# Patient Record
Sex: MALE | Race: WHITE
[De-identification: names, ages, dates, MRNs, and addresses within clinical notes are randomized per-mention and may not be internally consistent; named-entity substitution may affect disease eponyms.]

---

## 2022-01-06 ENCOUNTER — HOSPITAL ENCOUNTER (EMERGENCY)
Dept: HOSPITAL 61 - ER | Age: 22
Discharge: HOME | End: 2022-01-06
Payer: SELF-PAY

## 2022-01-06 VITALS — WEIGHT: 148.15 LBS | HEIGHT: 66 IN | BODY MASS INDEX: 23.81 KG/M2

## 2022-01-06 VITALS — SYSTOLIC BLOOD PRESSURE: 135 MMHG | DIASTOLIC BLOOD PRESSURE: 81 MMHG

## 2022-01-06 DIAGNOSIS — U07.1: Primary | ICD-10-CM

## 2022-01-06 PROCEDURE — 99285 EMERGENCY DEPT VISIT HI MDM: CPT

## 2022-01-06 PROCEDURE — 96361 HYDRATE IV INFUSION ADD-ON: CPT

## 2022-01-06 PROCEDURE — 96375 TX/PRO/DX INJ NEW DRUG ADDON: CPT

## 2022-01-06 PROCEDURE — 71045 X-RAY EXAM CHEST 1 VIEW: CPT

## 2022-01-06 PROCEDURE — 87426 SARSCOV CORONAVIRUS AG IA: CPT

## 2022-01-06 PROCEDURE — 96374 THER/PROPH/DIAG INJ IV PUSH: CPT

## 2022-01-06 NOTE — PHYS DOC
General Adult


EDM:


Chief Complaint:  HEADACHE





HPI:


HPI:





Patient is a 22  year old male who present to ER for evaluation of headache, 

fever and chills.  Patient said he has been sick with body ache flulike symptom 

nonproductive cough diarrhea with nausea for over a week.  Patient has been 

drinking water but not able to eat much.  Patient was not vaccinated for COVID-

19.  Patient said he feels dehydrated.  Patient denies any abdominal pain.





Review of Systems:


Review of Systems:


Constitutional:   Positive for fever and chills


Eyes:   Denies change in visual acuity. []


HENT:   Denies nasal congestion or sore throat. [] 


Respiratory:   Positive for nonproductive cough, no trouble breathing.


Cardiovascular:   Denies chest pain or edema. [] 


GI:   Denies abdominal pain, positive for nausea vomiting diarrhea.


:  Denies dysuria. [] 


Musculoskeletal:   Positive for back pain and muscle pain, joint pain


Integument:   Denies rash. [] 


Neurologic:   Denies headache, focal weakness or sensory changes. [] 


Endocrine:   Denies polyuria or polydipsia. [] 


Lymphatic:  Denies swollen glands. [] 


Psychiatric:  Denies depression or anxiety. []





Heart Score:


C/O Chest Pain:  N/A


Risk Factors:


Risk Factors:  DM, Current or recent (<one month) smoker, HTN, HLP, family 

history of CAD, obesity.


Risk Scores:


Score 0 - 3:  2.5% MACE over next 6 weeks - Discharge Home


Score 4 - 6:  20.3% MACE over next 6 weeks - Admit for Clinical Observation


Score 7 - 10:  72.7% MACE over next 6 weeks - Early Invasive Strategies





Current Medications:





Current Medications








 Medications


  (Trade)  Dose


 Ordered  Sig/Luisa  Start Time


 Stop Time Status Last Admin


Dose Admin


 


 Ketorolac


 Tromethamine


  (Toradol 30mg


 Vial)  30 mg  1X  ONCE  22 09:30


 22 10:05 DC 22 10:02


30 MG


 


 Ondansetron HCl


  (Zofran)  4 mg  1X  ONCE  22 09:30


 22 10:05 DC 22 10:02


4 MG


 


 Sodium Chloride  1,000 ml @ 


 1,000 mls/hr  1X  ONCE  22 09:30


 22 10:29 DC 22 10:00


1,000 MLS/HR











Allergies:


Allergies:





Allergies








Coded Allergies Type Severity Reaction Last Updated Verified


 


  No Known Drug Allergies    22 No











Physical Exam:


PE:





Constitutional: Well developed, well nourished, no acute distress, non-toxic 

appearance. []


HENT: Normocephalic, atraumatic, bilateral external ears normal, oropharynx 

moist, no oral exudates, nose normal. []


Eyes: PERRLA, EOMI, conjunctiva normal, no discharge. [] 


Neck: Normal range of motion, no tenderness, supple, no stridor. [] 


Cardiovascular:Heart rate regular rhythm, no murmur []


Lungs & Thorax:  Bilateral breath sounds clear to auscultation []


Abdomen: Bowel sounds normal, soft, no tenderness, no masses, no pulsatile 

masses. [] 


Skin: Warm, dry, no erythema, no rash. [] 


Back: No tenderness, no CVA tenderness. [] 


Extremities: No tenderness, no cyanosis, no clubbing, ROM intact, no edema. [] 


Neurologic: Alert and oriented X 3, normal motor function, normal sensory 

function, no focal deficits noted. []


Psychologic: Affect normal, judgement normal, mood normal. []





Current Patient Data:


Labs:





                                Laboratory Tests








Test


 22


10:08


 


SARS-CoV-2 Antigen (Rapid)


 Positive


(NEGATIVE)  *A








Vital Signs:





                                   Vital Signs








  Date Time  Temp Pulse Resp B/P (MAP) Pulse Ox O2 Delivery O2 Flow Rate FiO2


 


22 10:03 99.4 58 16  100   





 99.4       











EKG:


EKG:


[]





Radiology/Procedures:


Radiology/Procedures:


[][facility]


                    [address] [place] [address]


                                 [phone]


                                        


                                 IMAGING REPORT





                                     Signed





PATIENT: FAWN DURANT   ACCOUNT: [id number]     MRN#: [id number]


: 2000           LOCATION: ER              AGE: 22


SEX: M                    EXAM DT: 22         ACCESSION#: [id number]


STATUS: REG ER            ORD. PHYSICIAN: DEEJAY DIETZ DO


REASON: cough, COVID INFECTION


PROCEDURE: CHEST AP ONLY








INDICATION: Reason: cough, COVID INFECTION / Spl. Instructions:  / History: 





COMPARISON: None.





FINDINGS:





Single view of chest obtained.


No focal airspace consolidation.  


Cardiomediastinal contour unremarkable.





No acute osseous abnormality.








IMPRESSION:





*  No focal airspace consolidation or edema.





Electronically signed by: Freddy Perez MD (2022 11:08 AM) [id number]














DICTATED and SIGNED BY:     FREDDY PEREZ MD


DATE:     22 [id number] 0





Course & Med Decision Making:


Course & Med Decision Making


Pertinent Labs and Imaging studies reviewed. (See chart for details)





Patient is a 22-year-old male who present to ER due to body ache, fever and 

chills, headache, cough.  Patient also have diarrhea.  Symptoms have been going 

on for about 1 week.  Patient was tested positive for COVID-19.  His chest x-ray

 was normal.  Patient oxygen saturation with normal.  Patient was given IV fluid

 in ER, he feel much better.  Patient  will be discharged home.





Dragon Disclaimer:


Dragon Disclaimer:


This electronic medical record was generated, in whole or in part, using a voice

 recognition dictation system.





Departure


Departure


Impression:  


   Primary Impression:  


   COVID-19 virus infection


Disposition:   HOME / SELF CARE / HOMELESS


Condition:  IMPROVED


Referrals:  


NO PCP (PCP)


Follow up with your doctor as needed.


Patient Instructions:  Viral Syndrome





Additional Instructions:  


You have been tested for or diagnosed with COVID-19. It is an infection caused 

by a new type 


of coronavirus. COVID-19 will cause cold-like or mild flu symptoms in most. It 

can cause 


more severe symptoms like problems breathing in some.





There is no treatment for COVID-19. The body will clear the infection over time.

 Self-care 


will help to ease discomfort.





Steps to Take:


Self-Care


Rest as needed. Healthy habits may help you feel better. Steps include:





Choose healthy foods including fruits and vegetables. Drink water throughout the

 day.


Get plenty of sleep each night.


If you smoke, try to quit. It may ease breathing.


Avoid alcohol.


Keep Others Healthy


The virus can spread to others. Droplets are released every time you sneeze or 

cough. The 


droplets can get into the mouth, nose, or eyes of people near you and lead to 

infection. To 


lower the chances of spreading COVID-19 to others:





Stay at home until your doctor has said it is safe to leave. If you tested 

positive this 


will mean staying isolated until both of the following are true:





At least 7 days have passed since the start of illness.


You are free of fever for at least 72 hours without the use of medicine.


During this time:





 - Avoid public areas, events, or transportation. Do not return to work or 

school until your 


doctor has said it is safe to do so.


 - Call ahead if you need to go to a medical center. Let them know you may have 

COVID-19. It 


will help them guide you where to go. They may also ask you to wear a facemask 

when you come 


to the office.


 - If you call for emergency medical services, let them know you may have COVID-

19.


While at home:





 - Try to avoid close contact with others. Stay about 6 feet away.


 - If possible, spend most of your time in a separate room from others.


 - Use a face mask if you will be in close contact with others such as sharing a

 room or 


vehicle.


 - Have someone wipe down common surfaces in the home. Use household  

every day on 


areas like doorknobs, counters, or sinks.


 - Cough or sneeze into a tissue. Throw the tissue away right after use. If a 

tissue is not 


available, cough or sneeze into your elbow.


 - Wash your hands often. Wash them after sneezing or coughing. Use soap and 

water and wash 


for at least 20 seconds. Alcohol based hand  can be used if soap and 

water is not 


available.


 - Do not prepare food for others. Avoid sharing personal items like forks, 

spoons, or 


toothbrushes.


 - Avoid close contact with pets while you are sick. There is no evidence of the

 virus 


passing to pets. This is a safety step until more is known about this virus.


Isolation can be frustrating. Social interaction can help. Keep in touch with 

friends and 


family through phone and tech options. You can still interact with others in 

your home, just 


keep a safe distance of about 6 feet.





Follow-up:


Your doctors office will check in with you to see if there are any changes in 

your health. 


You may be asked to keep track of symptoms to share with them. They will also 

let you know 


when you are clear to be in public again.





Problems to Look Out For:


Contact your doctor if your recovery is not going as you expect. Get emergency 

care if you 


have problems such as:





 - Trouble breathing


 - Nonstop chest pain or pressure


 - Changes in awareness, confusion, or problems waking


 - Lips or face have bluish color


 - Worsening of symptoms


If you think you have an emergency, call for emergency medical services right a

way.





As taken from [email or web address]


Scripts


Prednisone (PREDNISONE) 20 Mg Tablet


1 TAB PO DAILY for 7 Days, #7 TAB


   Prov: DEEJAY DIETZ DO         22 


Albuterol Sulfate (PROAIR HFA INHALER) 8.5 Gm Hfa.aer.ad


2 PUFF IH PRN Q4-6HRS PRN for wheezing for 21 Days, #1 INHALER 0 Refills


   Prov: DEEJAY DIETZ DO         22











DEEJAY DIETZ DO                 2022 11:20

## 2022-01-06 NOTE — RAD
INDICATION: Reason: cough, COVID INFECTION / Spl. Instructions:  / History: 



COMPARISON: None.



FINDINGS:



Single view of chest obtained.

No focal airspace consolidation.  

Cardiomediastinal contour unremarkable.



No acute osseous abnormality.





IMPRESSION:



*  No focal airspace consolidation or edema.



Electronically signed by: Chau Perez MD (1/6/2022 11:08 AM) [id number]

## 2022-01-11 ENCOUNTER — HOSPITAL ENCOUNTER (EMERGENCY)
Dept: HOSPITAL 61 - ER | Age: 22
Discharge: HOME | End: 2022-01-11
Payer: SELF-PAY

## 2022-01-11 VITALS — WEIGHT: 160.28 LBS | HEIGHT: 67 IN | BODY MASS INDEX: 25.16 KG/M2

## 2022-01-11 VITALS — DIASTOLIC BLOOD PRESSURE: 78 MMHG | SYSTOLIC BLOOD PRESSURE: 115 MMHG

## 2022-01-11 DIAGNOSIS — F17.200: ICD-10-CM

## 2022-01-11 DIAGNOSIS — U07.1: Primary | ICD-10-CM

## 2022-01-11 PROCEDURE — 71045 X-RAY EXAM CHEST 1 VIEW: CPT

## 2022-01-11 PROCEDURE — 99283 EMERGENCY DEPT VISIT LOW MDM: CPT

## 2022-01-11 NOTE — PHYS DOC
Past Medical History


Past Surgical History:  No Surgical History


Smoking Status:  Current Every Day Smoker


Alcohol Use:  Occasionally





General Adult


EDM:


Chief Complaint:  CHEST PAIN





HPI:


HPI:





Patient is a 22  year old male with reported history of asthma, and recent COVID

positive test on  who presents with ongoing chills, body aches, cough, and 

now chest pain.


On  was given an albuterol inhaler and prednisone prescription.  States that 

the prednisone has made him feel anxious, so he did not take his dose yesterday 

or this morning.


States he has had left-sided chest discomfort.  It is occasional.  Sharp.  Not 

pleuritic.  Not exertional.  Feels slightly short of breath, but this has been 

constant for several days prior to any chest discomfort.


He is not vaccinated against COVID.


Reports occasional alcohol use in social situations..  Occasional marijuana.  No

other drug use.


No long-term medications.


No recent hospitalizations, immobilizations, surgeries, hemoptysis.  No history 

of cancer.





Review of Systems:


Review of Systems:


Constitutional: Reports chills


Eyes:   Denies change in visual acuity. []


HENT:   Reports nasal congestion, runny nose.


Respiratory:    reports cough and shortness of breath


Cardiovascular:   Reports chest pain


GI:   Denies abdominal pain, nausea, vomiting, bloody stools or diarrhea. [] 


:  Denies dysuria. [] 


Musculoskeletal:   Denies back pain or joint pain. [] 


Integument:   Denies rash. [] 


Neurologic: Reports headache.  Denies focal weakness or sensory changes. [] 


Psychiatric:  Denies depression or anxiety. []





Heart Score:


C/O Chest Pain:  Yes


HEART Score for Chest Pain:  








HEART Score for Chest Pain Response (Comments) Value


 


History Slighlty/Non-Suspicious 0


 


ECG Normal 0


 


Age < 45 0


 


Risk Factors No Risk Factors 0


 


Total  0








Risk Factors:


Risk Factors: None


Risk Scores:


Score 0 - 3:  2.5% MACE over next 6 weeks - Discharge Home





Allergies:


Allergies:





Allergies








Coded Allergies Type Severity Reaction Last Updated Verified


 


  No Known Drug Allergies    22 No











Physical Exam:


PE:





Constitutional: Well developed, well nourished, no acute distress, non-toxic 

appearance. []


HENT: Normocephalic, atraumatic, bilateral external ears normal, oropharynx 

moist, no oral exudates, nose normal. []


Eyes: PERRLA, EOMI, conjunctiva normal, no discharge. [] 


Neck: Normal range of motion, no tenderness, supple, no stridor. [] 


Cardiovascular:Heart rate regular rhythm, no murmur []


Lungs & Thorax:  Bilateral breath sounds clear to auscultation.  Normal work of 

breathing.  No wheezing.  []


Abdomen: Bowel sounds normal, soft, no tenderness, no masses, no pulsatile 

masses. [] 


Skin: Warm, dry, no erythema, no rash. [] 


Back: No tenderness, no CVA tenderness. [] 


Extremities: No tenderness, no cyanosis, no clubbing, ROM intact, no edema. [] 


Neurologic: Alert and oriented X 3, normal motor function, normal sensory 

function, no focal deficits noted. []


Psychologic: Affect normal, judgement normal, mood normal. []





EKG:


EKG:


Sinus rhythm.  Rate 57.  Normal axis.  Normal intervals.  No ST elevation or 

depression.  No pathologic Q waves or T wave inversion.  Interpretation: Normal 

EKG. No ischemic changes.[]





Radiology/Procedures:


Radiology/Procedures:


[]


Impression:


                            [facility]


                    [address] [place] [address]


                                 [phone]


                                        


                                 IMAGING REPORT





                                     Signed





PATIENT: FAWN DURANT ACCOUNT: [id number]     MRN#: [id number]


: 2000           LOCATION: ER              AGE: 22


SEX: M                    EXAM DT: 22         ACCESSION#: [id number]


STATUS: REG ER            ORD. PHYSICIAN: ALONDRA RODRIGUEZ MD


REASON: left sided chest pain, covid


PROCEDURE: CHEST AP ONLY





XR CHEST 1V 





INDICATION: left sided chest pain, covid 





COMPARISON STUDY: 2022.





FINDINGS:





Lungs: Normal lung volume. No pulmonary mass or consolidation. The 

tracheobronchial tree and hilar structures are normal.





Pleura: No pleural effusion or pneumothorax.





Heart and Mediastinum: The cardiomediastinal silhouette is normal. The great 

vessels of the thorax are normal.





Bones and Soft Tissues: The bones and soft tissues are within normal limits.





IMPRESSION:  


No acute cardiopulmonary process.





Electronically signed by: Lauri Soares MD (2022 8:34 AM) [id number]














DICTATED and SIGNED BY:     LAURI SOARES MD


DATE:     22 [id number] 0





Course & Med Decision Making:


Course & Med Decision Making


Pertinent Labs and Imaging studies reviewed. (See chart for details)





Patient a 22-year-old male who recently tested positive for COVID on  who 

presents with ongoing COVID symptoms and new chest pain.


On arrival is afebrile, hemodynamically stable.  Satting % on room air.


PERC negative.  No further PE work-up will be obtained


EKG normal as described above.  Not ischemic.  No risk factors for ACS.  Do not 

feel troponin is indicated.  With normal EKG doubt myocarditis/pericarditis.


Will check CXR to exclude pneumothorax or other pulmonary pathology.


No significant volume losses to suggest need for electrolyte testing.


If CXR is clear feel he will be safe for discharge with ongoing expectant 

management.





Dragon Disclaimer:


Dragon Disclaimer:


This electronic medical record was generated, in whole or in part, using a voice

 recognition dictation system.





Departure


Departure


Impression:  


   Primary Impression:  


   COVID-19


Disposition:   HOME / SELF CARE / HOMELESS


Condition:  STABLE


Referrals:  


NO PCP (PCP)





Additional Instructions:  


You have Covid.





Isolation:


-You must have at least 3 days of no fever/chills, and improving symptoms before

 you end your isolation.


-If you are continuing to be symptomatic or having high fevers you need to 

continue your isolation until you meet the above requirements.





Monitoring:


-Please buy a home pulse oximeter.  These can be purchased over-the-counter most

 pharmacies, or on [facility] [employer].


-Check your oxygen levels once a day.  If they are persistently below 90% please

 return to the emergency department.


-If you develop chest pain or worsening shortness of breath please return to the

 emergency department.





For fever/body aches tylenol and ibuprofen are best used on a schedule. Please 

alternate between the two.





-Tylenol 1000 mg every 6 hours (do not exceed 4000 mg in one day)


-Ibuprofen 400-600 mg every 6 hours. Take with food. Do not take for more than 1

 week.











ALONDRA RODRIGUEZ MD              2022 07:48

## 2022-01-11 NOTE — RAD
XR CHEST 1V 



INDICATION: left sided chest pain, covid 



COMPARISON STUDY: 1/6/2022.



FINDINGS:



Lungs: Normal lung volume. No pulmonary mass or consolidation. The tracheobronchial tree and hilar st
ructures are normal.



Pleura: No pleural effusion or pneumothorax.



Heart and Mediastinum: The cardiomediastinal silhouette is normal. The great vessels of the thorax ar
e normal.



Bones and Soft Tissues: The bones and soft tissues are within normal limits.



IMPRESSION:  

No acute cardiopulmonary process.



Electronically signed by: Lauri Rodriguez MD (1/11/2022 8:34 AM) [id number]